# Patient Record
Sex: MALE | Race: WHITE | NOT HISPANIC OR LATINO | Employment: OTHER | URBAN - METROPOLITAN AREA
[De-identification: names, ages, dates, MRNs, and addresses within clinical notes are randomized per-mention and may not be internally consistent; named-entity substitution may affect disease eponyms.]

---

## 2020-03-05 ENCOUNTER — OFFICE VISIT (OUTPATIENT)
Dept: DERMATOLOGY | Facility: CLINIC | Age: 60
End: 2020-03-05
Payer: COMMERCIAL

## 2020-03-05 DIAGNOSIS — R22.9 SUBCUTANEOUS NODULE: ICD-10-CM

## 2020-03-05 DIAGNOSIS — D22.9 MULTIPLE BENIGN NEVI: ICD-10-CM

## 2020-03-05 DIAGNOSIS — L82.1 SK (SEBORRHEIC KERATOSIS): Primary | ICD-10-CM

## 2020-03-05 PROBLEM — M16.12 OSTEOARTHRITIS OF LEFT HIP: Status: ACTIVE | Noted: 2019-10-09

## 2020-03-05 PROCEDURE — 99203 OFFICE O/P NEW LOW 30 MIN: CPT | Performed by: DERMATOLOGY

## 2020-03-05 RX ORDER — VITAMIN B COMPLEX
1 CAPSULE ORAL
COMMUNITY
End: 2020-04-20

## 2020-03-05 ASSESSMENT — PAIN SCALES - GENERAL: PAINLEVEL: NO PAIN (0)

## 2020-03-05 NOTE — NURSING NOTE
Sander Chavira's goals for this visit include:   Chief Complaint   Patient presents with     Skin Check     areas of concern: back, bump inside mouth, buttocks family hx SC        He requests these members of his care team be copied on today's visit information:     PCP: Steve Mays    Referring Provider:  No referring provider defined for this encounter.    There were no vitals taken for this visit.    Do you need any medication refills at today's visit? So Ferris LPN

## 2020-03-05 NOTE — PATIENT INSTRUCTIONS
Cryotherapy    What is it?    Use of a very cold liquid, such as liquid nitrogen, to freeze and destroy abnormal skin cells that need to be removed    What should I expect?    Tenderness and redness    A small blister that might grow and fill with dark purple blood. There may be crusting.    More than one treatment may be needed if the lesions do not go away.    How do I care for the treated area?    Gently wash the area with your hands when bathing.    Use a thin layer of Vaseline to help with healing. You may use a Band-Aid.     The area should heal within 7-10 days and may leave behind a pink or lighter color.     Do not use an antibiotic or Neosporin ointment.     You may take acetaminophen (Tylenol) for pain.     Call your Doctor if you have:    Severe pain    Signs of infection (warmth, redness, cloudy yellow drainage, and or a bad smell)    Questions or concerns    Who should I call with questions?       [facility]: [phone]       [facility]: [phone]       For urgent needs outside of business hours call the [facility] at [phone]        and ask for the dermatology resident on call
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
## 2020-04-20 ENCOUNTER — OFFICE VISIT (OUTPATIENT)
Dept: DERMATOLOGY | Facility: CLINIC | Age: 60
End: 2020-04-20
Payer: COMMERCIAL

## 2020-04-20 DIAGNOSIS — D10.30 FIBROMA OF INTRAORAL REGION: ICD-10-CM

## 2020-04-20 DIAGNOSIS — L82.1 SEBORRHEIC KERATOSIS: ICD-10-CM

## 2020-04-20 DIAGNOSIS — D17.1 LIPOMA OF BUTTOCK: ICD-10-CM

## 2020-04-20 DIAGNOSIS — D18.01 CHERRY ANGIOMA: Primary | ICD-10-CM

## 2020-04-20 DIAGNOSIS — D48.5 NEOPLASM OF UNCERTAIN BEHAVIOR OF SKIN: ICD-10-CM

## 2020-04-20 DIAGNOSIS — L82.0 INFLAMED SEBORRHEIC KERATOSIS: ICD-10-CM

## 2020-04-20 PROCEDURE — 11102 TANGNTL BX SKIN SINGLE LES: CPT | Mod: 59 | Performed by: DERMATOLOGY

## 2020-04-20 PROCEDURE — 11301 SHAVE SKIN LESION 0.6-1.0 CM: CPT | Performed by: DERMATOLOGY

## 2020-04-20 PROCEDURE — 88304 TISSUE EXAM BY PATHOLOGIST: CPT | Mod: TC | Performed by: DERMATOLOGY

## 2020-04-20 PROCEDURE — 88305 TISSUE EXAM BY PATHOLOGIST: CPT | Mod: TC | Performed by: DERMATOLOGY

## 2020-04-20 PROCEDURE — 40808 BIOPSY OF MOUTH LESION: CPT | Performed by: DERMATOLOGY

## 2020-04-20 PROCEDURE — 11401 EXC TR-EXT B9+MARG 0.6-1 CM: CPT | Mod: 59 | Performed by: DERMATOLOGY

## 2020-04-20 PROCEDURE — 17110 DESTRUCTION B9 LES UP TO 14: CPT | Mod: 59 | Performed by: DERMATOLOGY

## 2020-04-20 RX ORDER — MUPIROCIN 20 MG/G
OINTMENT TOPICAL
Qty: 22 G | Refills: 0 | Status: SHIPPED | OUTPATIENT
Start: 2020-04-20

## 2020-04-20 ASSESSMENT — PAIN SCALES - GENERAL: PAINLEVEL: NO PAIN (0)

## 2020-04-20 NOTE — PATIENT INSTRUCTIONS
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
## 2020-04-20 NOTE — PROGRESS NOTES
[facility] Dermatology Note      Dermatology Problem List:  1. 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 Lipoma right medial buttocks, punch excision 4/20/20  3. 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 Inflamed SK left flank, s/p shave removal 4/20/20  5. Inflamed cherry angioma, right chest     CC:   Chief Complaint   Patient presents with     Derm Problem     spot that itches on upper left back, a spot on mid chest, a white bump inner side of right cheek, and then the bump on right cheek - been there for years.          Encounter Date: Apr 20, 2020    History of Present Illness:  Mr. Sander Chavira is a 59 year old male who presents for evaluation of several bothersome papules.     Dr. Puri referred him for treatment of a possible cyst of right medial buttocks. He notes it's been there for years without significant changes, but at times he will sit on the spot and the bump will be painful. It's never expressed anything. It may have started as a pimple or sweat bump.     His primary MD froze a spot on his left mid back. The spot seems to have grown back. It also gets rubbed and itchy intermittently.   He noticed a papule on his midline chest is getting darker. It's not bled.  There is a small bright red bump on the right side of his chest that will get rubbed and irritated.     There is a firm papule on the right buccal mucosa that may have started as a bite injury but he cannot recall. It is bothersome and painful when bitten.        Past Medical History:   Patient Active Problem List   Diagnosis     Osteoarthritis of left hip     Surgical Hx  Vasectomy    Social History:  Patient reports that he has never smoked. He uses smokeless tobacco.      Medications:  Current Outpatient Medications   Medication Sig Dispense Refill     cholecalciferol (D 5000) 125 MCG (5000 UT) CAPS Take 5,000 Units by mouth       vitamin B complex with vitamin C  (STRESS TAB) tablet Take 1 tablet by mouth       LISINOPRIL PO        Allergies   Allergen Reactions     Adhesive Tape Rash     Sulfa Drugs Rash     Hmg-Coa-R Inhibitors      Valtrex [Valacyclovir] Diarrhea     Calamine Rash       Review of Systems:  -Constitutional: Otherwise feeling well today, in usual state of health.  -HEENT: Patient denies nonhealing oral sores.  -Skin: As above in HPI. No additional skin concerns.    Physical exam:  Vitals: There were no vitals taken for this visit.  GEN: This is a well developed, well-nourished male in no acute distress, in a pleasant mood.    SKIN: Focused skin exam excluding the undergarment areas was performed. The exam included the head/face, neck, both arms, chest, back, abdomen, both legs, digits and/or nails.   - Camp skin type: III  - There are dome shaped bright red papules on the trunk neck and extremities.   - There is a tan to brown waxy stuck on papule with surrounding erythema on the left flank.   - There are waxy stuck on tan to brown papules on the trunk and extremities.  - Firm papule, 5mm, on the right buccal mucosa  - right medial buttocks, 1cm localized soft fullness. No buttonhole, no dimple sign, no overlying punctum.   - No other lesions of concern on areas examined.     Impression/Plan:  1. 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     After discussion of benefits and risks including but not limited to bleeding, infection, scar, incomplete removal, recurrence, and non-diagnostic pathology, written consent and photographs were obtained. The area was cleaned with isopropyl alcohol. 1mL of 1% lidocaine with epinephrine was injected to obtain adequate anesthesia of the lesion on the right inner cheek mucosa. A shave excision 0.5cm specimen was performed. Hemostasis was achieved with aluminium chloride. Vaseline and a sterile dressing were applied. The patient tolerated the procedure and  no complications were noted. The patient was provided with verbal and written post care instructions.     2. Lipoma right medial buttocks, punch excision 4/20/20  Bothersome to patient as some seated positions are painful.     After discussion of benefits and risks including but not limited to bleeding, infection, scar, incomplete removal, recurrence, and non-diagnostic pathology, written consent and photographs were obtained. The area was cleaned with isopropyl alcohol. 2mL of 1% lidocaine with epinephrine was injected to obtain adequate anesthesia of the lesion on the right medial buttocks. A 6 mm punch biopsy was performed. The firm adipose nodule was dissected from adjacent tissue with scissors.   4-0 monocryl buried vertical mattress sutures deployed to appose the skin edges. 5-0 Fast Abs Gut sutures were utilized to approximate the epidermal edges.  White petroleum jelly/VaselineTM and a bandage was applied to the wound.  Explicit verbal and written wound care instructions were provided.  The patient left the Dermatology Clinic in good condition.      3. Neoplasm of uncertain behavior, central chest s/p biopsy 4/20/20  After discussion of benefits and risks including but not limited to bleeding, infection, scar, incomplete removal, recurrence, and non-diagnostic biopsy, written consent and photographs were obtained. The area was cleaned with isopropyl alcohol. 1mL of 1% lidocaine with epinephrine was injected to obtain adequate anesthesia of the lesion on the central chest. A shave biopsy was performed. Hemostasis was achieved with aluminium chloride. Vaseline and a sterile dressing were applied. The patient tolerated the procedure and no complications were noted. The patient was provided with verbal and written post care instructions.    ** Update - IDN with ISK collision    4. Inflamed SK left lateral mid back, s/p shave removal 4/20/20  Previously treated with LN2, however it seems to be growing back and  gets irritated by clothing.     After discussion of benefits and risks including but not limited to bleeding, infection, scar, incomplete removal, and non-diagnostic pathology, written consent and photographs were obtained. The area was cleaned with isopropyl alcohol. 1mL of 1% lidocaine was injected to obtain adequate anesthesia of the lesion on the left lateral mid back, measuring 10mm. A shave removal was performed. Hemostasis was achieved with aluminium chloride. Vaseline and a sterile dressing were applied. The patient tolerated the procedure and no complications were noted. The patient was provided with verbal and written post care instructions.     5. Inflamed Cherry Angioma, right lateral chest.   Small papule on right chest will be traumatized intermittently and become painful.     Hyfrecation procedure note: After verbal consent and discussion of risks and benefits including but no limited to dyspigmentation/scar, blister, and pain, anesthesia was achieved with 1% lidocaine with epinephrine. 1 inflamed cherry angioma was treated with a blunt tip hyfrecation probe on setting 6. The bright red color resolved and superficial roxane was removed with gauze.  Post hyfrecation instructions were provided.       Follow-up 1 year with Dr. Puri for skin cancer screening.       Staff Involved:  Staff Only    Provider Disclosure:   The documentation recorded by the scribe accurately reflects the services I personally performed and the decisions made by me.  I personally performed the procedures today.    Kevin Willis DO    Department of Dermatology  [facility]: Phone: [phone], Fax:[phone]  [facility]: Phone: [phone], Fax: [phone]

## 2020-04-20 NOTE — NURSING NOTE
Sander Chavira's goals for this visit include:   Chief Complaint   Patient presents with     Derm Problem     spot that itches on upper left back, a spot on mid chest, a white bump inner side of right cheek, and then the bump on right cheek - been there for years.        He requests these members of his care team be copied on today's visit information: Yes     PCP: Steve Mays    Referring Provider:  No referring provider defined for this encounter.    There were no vitals taken for this visit.     Do you need any medication refills at today's visit? No   LXIONG3, MEDICAL ASSISTANT

## 2020-04-23 ENCOUNTER — TELEPHONE (OUTPATIENT)
Dept: DERMATOLOGY | Facility: CLINIC | Age: 60
End: 2020-04-23

## 2020-04-23 LAB — COPATH REPORT: NORMAL

## 2020-04-23 NOTE — TELEPHONE ENCOUNTER
Notes recorded by Nuria Blankenship RN on 4/23/2020 at 4:10 PM CDT   I spoke with Rodrigo and notified him of the results.  He verbalized understanding and stated his bx sites are healing well.  He declined scheduling his annual follow up and stated he will call back.   Nuria Blankenship RN     ------     Notes recorded by Kevin Granado MD on 4/23/2020 at 3:55 PM CDT   Please call the patient with pathology results.     The biopsies were all benign.     The buttocks was a lipoma.   The back was an SK.   The inner cheek was a bump of scar tissue called a bite fibroma.   The chest had both an SK and a benign mole.     As long as the wounds are healing well, no additional surgery is necessary.   1 year skin cancer screening is appropriate.   Thank you.    Dermatological path order and indications   Order: [id number]   Status:  Final result   Visible to patient:  No (not released) Dx:  Neoplasm of uncertain behavior of ski...   Component  3d ago   Copath Report  Patient Name: RODRIGO MOLINA   MR#: [id number]   Specimen #: [id number]   Collected: 4/20/2020   Received: 4/21/2020   Reported: 4/23/2020 11:47   Ordering Phy(s): KEVIN GRANADO     For improved result formatting, select 'View Enhanced Report Format' under    Linked Documents section.     SPECIMEN(S):   A: Skin, right buttock, punch   B: Skin, left lateral mid back, shave   C: Skin, right inner cheek, shave   D: Skin, central chest, shave     FINAL DIAGNOSIS:   A. Skin, right buttock, punch:   - Lipoma - (see description)     B. Skin, left lateral mid back, shave:   - Seborrheic keratosis - (see description)     C. Skin, right inner cheek, shave:   - Acanthosis and submucosal fibrosis, consistent with bite fibroma - (see   description)      D. Skin, central chest, shave:   - Intradermal melanocytic nevus adjacent to inflamed seborrheic keratosis           Nuria Blankenship RN

## 2020-10-12 ENCOUNTER — TELEPHONE (OUTPATIENT)
Dept: DERMATOLOGY | Facility: CLINIC | Age: 60
End: 2020-10-12

## 2020-10-12 NOTE — TELEPHONE ENCOUNTER
[facility] Call Center    Phone Message    May a detailed message be left on voicemail: yes     Reason for Call: Patient called wanting to reschedule his follow up with Dr. Willis. Please advise. Thank you.    Action Taken: Message routed to:  Adult Clinics: Dermatology p [id number]    Travel Screening: Not Applicable

## 2022-11-17 ENCOUNTER — TRANSCRIBE ORDERS (OUTPATIENT)
Dept: OTHER | Age: 62
End: 2022-11-17

## 2022-11-17 DIAGNOSIS — R53.1 WEAKNESS: Primary | ICD-10-CM

## 2023-03-09 NOTE — TELEPHONE ENCOUNTER
Records Requested     March 9, 2023 10:30 AM   [id number]   Facility   [facility] ([facility])   Outcome 10:30am Sent request for MRI Imaging to be pushed to PACs. -JA     Action 3/31/23 MV 9.20am   Action Taken 1) 2nd imaging request faxed to [facility]  2) Request faxed to [facility] for additional records and imaging         RECORDS RECEIVED FROM: External   REASON FOR VISIT: Myopathy   Date of Appt: 5/18/23   NOTES (FOR ALL VISITS) STATUS DETAILS   OFFICE NOTE from referring provider In process  11/17/22 Srikanth Brown MD -  [facility]   OFFICE NOTE from other specialist Care Everywhere  11/13/22  [facility]   MEDICATION LIST Care Everywhere    IMAGING  (FOR ALL VISITS)     MRI (HEAD, NECK, SPINE) In process [facility]:  12/13/17 MRI Lumbar Spine

## 2023-04-14 ENCOUNTER — LAB (OUTPATIENT)
Dept: LAB | Facility: HOSPITAL | Age: 63
End: 2023-04-14
Payer: COMMERCIAL

## 2023-04-14 ENCOUNTER — OFFICE VISIT (OUTPATIENT)
Dept: NEUROLOGY | Facility: CLINIC | Age: 63
End: 2023-04-14
Payer: COMMERCIAL

## 2023-04-14 ENCOUNTER — OFFICE VISIT (OUTPATIENT)
Dept: NEUROLOGY | Facility: CLINIC | Age: 63
End: 2023-04-14
Attending: PSYCHIATRY & NEUROLOGY
Payer: COMMERCIAL

## 2023-04-14 VITALS — DIASTOLIC BLOOD PRESSURE: 95 MMHG | HEART RATE: 66 BPM | WEIGHT: 182.4 LBS | SYSTOLIC BLOOD PRESSURE: 135 MMHG

## 2023-04-14 DIAGNOSIS — G62.9 NEUROPATHY: Primary | ICD-10-CM

## 2023-04-14 DIAGNOSIS — M79.2 NEUROPATHIC PAIN: ICD-10-CM

## 2023-04-14 DIAGNOSIS — G62.9 NEUROPATHY: ICD-10-CM

## 2023-04-14 DIAGNOSIS — R29.898 WEAKNESS OF BOTH LOWER EXTREMITIES: ICD-10-CM

## 2023-04-14 LAB
ALBUMIN SERPL BCG-MCNC: 4.2 G/DL (ref 3.5–5.2)
ALP SERPL-CCNC: 58 U/L (ref 40–129)
ALT SERPL W P-5'-P-CCNC: 41 U/L (ref 10–50)
AMMONIA PLAS-SCNC: 16 UMOL/L (ref 16–60)
ANION GAP SERPL CALCULATED.3IONS-SCNC: 9 MMOL/L (ref 7–15)
AST SERPL W P-5'-P-CCNC: 27 U/L (ref 10–50)
BASOPHILS # BLD AUTO: 0 10E3/UL (ref 0–0.2)
BASOPHILS NFR BLD AUTO: 1 %
BILIRUB SERPL-MCNC: 0.5 MG/DL
BUN SERPL-MCNC: 16.1 MG/DL (ref 8–23)
CALCIUM SERPL-MCNC: 8.6 MG/DL (ref 8.8–10.2)
CHLORIDE SERPL-SCNC: 105 MMOL/L (ref 98–107)
CK SERPL-CCNC: 80 U/L (ref 39–308)
CREAT SERPL-MCNC: 1.01 MG/DL (ref 0.67–1.17)
DEPRECATED HCO3 PLAS-SCNC: 25 MMOL/L (ref 22–29)
EOSINOPHIL # BLD AUTO: 0.2 10E3/UL (ref 0–0.7)
EOSINOPHIL NFR BLD AUTO: 4 %
ERYTHROCYTE [DISTWIDTH] IN BLOOD BY AUTOMATED COUNT: 13.1 % (ref 10–15)
GFR SERPL CREATININE-BSD FRML MDRD: 84 ML/MIN/1.73M2
GLUCOSE SERPL-MCNC: 104 MG/DL (ref 70–99)
HBA1C MFR BLD: 5.7 %
HCT VFR BLD AUTO: 41.9 % (ref 40–53)
HGB BLD-MCNC: 14.1 G/DL (ref 13.3–17.7)
IMM GRANULOCYTES # BLD: 0 10E3/UL
IMM GRANULOCYTES NFR BLD: 0 %
LYMPHOCYTES # BLD AUTO: 1.3 10E3/UL (ref 0.8–5.3)
LYMPHOCYTES NFR BLD AUTO: 32 %
Lab: NORMAL
MAGNESIUM SERPL-MCNC: 2 MG/DL (ref 1.7–2.3)
MCH RBC QN AUTO: 31.4 PG (ref 26.5–33)
MCHC RBC AUTO-ENTMCNC: 33.7 G/DL (ref 31.5–36.5)
MCV RBC AUTO: 93 FL (ref 78–100)
MONOCYTES # BLD AUTO: 0.4 10E3/UL (ref 0–1.3)
MONOCYTES NFR BLD AUTO: 10 %
NEUTROPHILS # BLD AUTO: 2.2 10E3/UL (ref 1.6–8.3)
NEUTROPHILS NFR BLD AUTO: 53 %
NRBC # BLD AUTO: 0 10E3/UL
NRBC BLD AUTO-RTO: 0 /100
PERFORMING LABORATORY: NORMAL
PLATELET # BLD AUTO: 247 10E3/UL (ref 150–450)
POTASSIUM SERPL-SCNC: 4.5 MMOL/L (ref 3.4–5.3)
PROT SERPL-MCNC: 6.7 G/DL (ref 6.4–8.3)
RBC # BLD AUTO: 4.49 10E6/UL (ref 4.4–5.9)
SODIUM SERPL-SCNC: 139 MMOL/L (ref 136–145)
TEST NAME: NORMAL
TOTAL PROTEIN SERUM FOR ELP: 6.6 G/DL (ref 6.4–8.3)
TSH SERPL DL<=0.005 MIU/L-ACNC: 1.33 UIU/ML (ref 0.3–4.2)
VIT B12 SERPL-MCNC: 407 PG/ML (ref 232–1245)
WBC # BLD AUTO: 4.1 10E3/UL (ref 4–11)

## 2023-04-14 PROCEDURE — 82607 VITAMIN B-12: CPT

## 2023-04-14 PROCEDURE — 85025 COMPLETE CBC W/AUTO DIFF WBC: CPT

## 2023-04-14 PROCEDURE — 86334 IMMUNOFIX E-PHORESIS SERUM: CPT | Mod: 26

## 2023-04-14 PROCEDURE — 80053 COMPREHEN METABOLIC PANEL: CPT

## 2023-04-14 PROCEDURE — 86036 ANCA SCREEN EACH ANTIBODY: CPT

## 2023-04-14 PROCEDURE — 36415 COLL VENOUS BLD VENIPUNCTURE: CPT

## 2023-04-14 PROCEDURE — 84165 PROTEIN E-PHORESIS SERUM: CPT | Mod: 26

## 2023-04-14 PROCEDURE — 83036 HEMOGLOBIN GLYCOSYLATED A1C: CPT

## 2023-04-14 PROCEDURE — 83519 RIA NONANTIBODY: CPT

## 2023-04-14 PROCEDURE — 95886 MUSC TEST DONE W/N TEST COMP: CPT | Mod: LT | Performed by: PSYCHIATRY & NEUROLOGY

## 2023-04-14 PROCEDURE — 99205 OFFICE O/P NEW HI 60 MIN: CPT | Mod: 25 | Performed by: PSYCHIATRY & NEUROLOGY

## 2023-04-14 PROCEDURE — 82140 ASSAY OF AMMONIA: CPT

## 2023-04-14 PROCEDURE — 83516 IMMUNOASSAY NONANTIBODY: CPT

## 2023-04-14 PROCEDURE — 82390 ASSAY OF CERULOPLASMIN: CPT

## 2023-04-14 PROCEDURE — 86618 LYME DISEASE ANTIBODY: CPT

## 2023-04-14 PROCEDURE — 83735 ASSAY OF MAGNESIUM: CPT

## 2023-04-14 PROCEDURE — 82525 ASSAY OF COPPER: CPT

## 2023-04-14 PROCEDURE — 84155 ASSAY OF PROTEIN SERUM: CPT | Mod: 91

## 2023-04-14 PROCEDURE — 82397 CHEMILUMINESCENT ASSAY: CPT

## 2023-04-14 PROCEDURE — 82550 ASSAY OF CK (CPK): CPT

## 2023-04-14 PROCEDURE — 95910 NRV CNDJ TEST 7-8 STUDIES: CPT | Performed by: PSYCHIATRY & NEUROLOGY

## 2023-04-14 PROCEDURE — 82164 ANGIOTENSIN I ENZYME TEST: CPT

## 2023-04-14 PROCEDURE — 86038 ANTINUCLEAR ANTIBODIES: CPT

## 2023-04-14 PROCEDURE — 86255 FLUORESCENT ANTIBODY SCREEN: CPT

## 2023-04-14 PROCEDURE — 84443 ASSAY THYROID STIM HORMONE: CPT

## 2023-04-14 PROCEDURE — 84165 PROTEIN E-PHORESIS SERUM: CPT | Mod: TC | Performed by: PATHOLOGY

## 2023-04-14 PROCEDURE — 84425 ASSAY OF VITAMIN B-1: CPT

## 2023-04-14 PROCEDURE — 84999 UNLISTED CHEMISTRY PROCEDURE: CPT

## 2023-04-14 PROCEDURE — 86334 IMMUNOFIX E-PHORESIS SERUM: CPT | Performed by: PATHOLOGY

## 2023-04-14 NOTE — LETTER
4/14/2023         RE: Sander Chavira  [address]  [place] [address]        Dear Colleague,    Thank you for referring your patient, Sander Chavira, to the [facility] [place]. Please see a copy of my visit note below.    NEUROLOGY OUTPATIENT CONSULT NOTE   Apr 14, 2023     CHIEF COMPLAINT/REASON FOR VISIT/REASON FOR CONSULT  Patient presents with:  weakness: New patient    REASON FOR CONSULTATION- Leg weakness/pain    REFERRAL SOURCE  Dr. Srikanth Brown  CC Dr. Srikanth Brown    HISTORY OF PRESENT ILLNESS  Sander Chavira is a 62 year old male seen today for evaluation of leg weakness/pain.  In 2015 he was put on simvastatin for hyperlipidemia.  He is not sure what dose he was put on.  He slowly started having more and more symptoms.  His symptoms consist of generalized weakness and generalized pain.  He realized that this was secondary to the medication and then stopped the medication.  Symptoms did slowly improve but did not completely resolve.  Over the last few years he continues to have weakness in both legs along with a lot of pain.  Does have some numbness in his feet.  He is unable to do a lot of exercises as that makes the pain worse.  He is not currently employed.  When he tries to walk he feels like he is walking on hot coal.  Does report some minor balance issues.  Feels like he is tripping on rugs/carpets.  No major neck pain or back pain.    [facility]  Extensive test including MRI of the brain/cervical spine/T-spine/L-spine were done and these were noncontributory per the patient.  He also had a EMG which was negative.  Did have extensive blood testing that was negative as well.  Has not noticed any new symptoms in the last year though there has been progression of his symptoms.  No family history of neurological problems.    Previous history is reviewed and this is unchanged.    PAST MEDICAL/SURGICAL HISTORY  No past medical history  on file.  Patient Active Problem List   Diagnosis     Osteoarthritis of left hip   Significant for high cholesterol, high blood pressure    FAMILY HISTORY  No family history on file.   High cholesterol high blood pressure migraine headaches arthritis cancer/leukemia.  Skin cancer.    SOCIAL HISTORY  Social History     Tobacco Use     Smoking status: Never     Smokeless tobacco: Current       SYSTEMS REVIEW  Twelve-system ROS was done and other than the HPI this was negative except for arm and leg pain, numbness/tingling, weakness (, difficulty walking, ringing in the ears.    MEDICATIONS  cholecalciferol (D 5000) 125 MCG (5000 UT) CAPS, Take 5,000 Units by mouth  vitamin B complex with vitamin C (STRESS TAB) tablet, Take 1 tablet by mouth  LISINOPRIL PO,   mupirocin (BACTROBAN) 2 % external ointment, Use 2 times a day to affected area. (Patient not taking: Reported on 4/14/2023)    No current facility-administered medications on file prior to visit.       PHYSICAL EXAMINATION  VITALS: BP (!) 135/95   Pulse 66   Wt 82.7 kg (182 lb 6.4 oz)   GENERAL: Healthy appearing, alert, no acute distress, normal habitus.  CARDIOVASCULAR: Extremities warm and well perfused. Pulses present.   NEUROLOGICAL:  Patient is awake and oriented to self, place and time.  Attention span is normal.  Memory is grossly intact.  Language is fluent and follows commands appropriately.  Appropriate fund of knowledge. Cranial nerves 2-12 are intact. There is no pronator drift.  Motor exam shows 5/5 strength in upper extremities.  Has 3+/5 strength in bilateral hip flexion knee flexion and extension.  Dorsiflexion plantarflexion are strong.  Tone is symmetric bilaterally in upper and lower extremities.  Reflexes are symmetric and absent in upper extremities and lower extremities.  Ankle jerks are absent.  Sensory exam is grossly intact to light touch, pin prick and vibration with slightly decreased pinprick and vibration in the feet.  Finger to  nose and heel to shin is without dysmetria.  Romberg is negative.  Gait is slightly wide-based and the patient is able to do tandem walk and walk on toes and heels with some difficulty.    DIAGNOSTICS  MRI L spine 2017  IMPRESSION:   Mild degenerative changes.  Negative for significant disc protrusion spinal   canal or neural foraminal compromise.    XR pelvis -2020  IMPRESSION:   Expected immediate postoperative appearance of right total hip arthroplasty.    RELEVANT LABS  CBC and CMP in 2020 were noncontributory.    OUTSIDE RECORDS  Outside referral notes and chart notes were reviewed and pertinent information has been summarized (in addition to the HPI):-      IMPRESSION/REPORT/PLAN  History of statin use  Rule out myopathy  Neuropathy  Neuropathic pain  Weakness of both lower extremities    This is a 62 year old male with bilateral leg weakness and generalized pain after use of statins.  Exam today shows absent generalized reflexes with bilateral leg weakness and decreased pinprick and vibratory sense in the feet.  Overall exam is suggestive of neuropathy though there could be superimposed myopathy as well.  Previous testing per patient report has been negative though I do not have a copy of the results.    We will start by repeating the EMG to see if it can confirm the diagnosis of neuropathy/myopathy.  We will check blood work to look for causes of neuropathy/myopathy.  Possibly given the statin use right before the symptoms came on this could be anti-[employer] a reductase inhibitor antibody syndrome.  Could consider lumbar puncture for possible CIDP also if blood work has been negative.  We will also check a myasthenia gravis panel since his symptoms are worse with exercise.  Discussed prognosis/treatment options with the patient.    I can see him back in 1 month.    -     Vitamin B12; Future  -     Vitamin B1 whole blood; Future  -     TSH with free T4 reflex; Future  -     Protein Immunofixation Serum;  Future  -     Protein electrophoresis; Future  -     Ammonia; Future  -     ANCA IgG by IFA with Reflex to Titer; Future  -     Angiotensin converting enzyme; Future  -     Anti Nuclear Americo IgG by IFA with Reflex; Future  -     CK total; Future  -     Ceruloplasmin; Future  -     Copper level; Future  -     Comprehensive metabolic panel; Future  -     CBC with Platelets & Differential; Future  -     Lyme Disease Total Abs Bld with Reflex to Confirm CLIA; Future  -     Magnesium; Future  -     ACETYLCHOLINE RECEPTOR BINDING; Future  -     STRIATED MUSCLE ANTIBODY IGG; Future  -     ACETYLCHOLINE MODULATING ANTIBODY; Future  -     ACETYLCHOLINE RECEPTOR BLOCKING AMERICO; Future  -     Hemoglobin A1c; Future  -     GM1 antibody panel; Future  -     [facility]; HmGcoA antibody test; HmGcoA antibody test (Laboratory Miscellaneous Order); Future  -     EMG; Future  - [facility].    Return in about 1 month (around 5/14/2023) for In-Clinic Visit (must), After testing.    Over 75 minutes were spent coordinating the care for the patient on the day of the encounter.  This includes previsit, during visit and post visit activities as documented above.  Counseling patient.  Multiple test ordered.  Chart reviewed the pertinent records not available.  Requesting outside records.  (Activities include but not inclusive of reviewing chart, reviewing outside records, reviewing labs and imaging study results as well as the images, patient visit time including getting history and exam,  use if applicable, review of test results with the patient and coming up with a plan in a shared model, counseling patient and family, education and answering patient questions, EMR , EMR diagnosis entry and problem list management, medication reconciliation and prescription management if applicable, paperwork if applicable, printing documents and documentation of the visit activities.)      Harsh  MD Jayne  Neurologist  [facility]  Tel:- [phone]    This note was dictated using voice recognition software.  Any grammatical or context distortions are unintentional and inherent to the software.        Again, thank you for allowing me to participate in the care of your patient.        Sincerely,        Сергей Godoy MD

## 2023-04-14 NOTE — LETTER
4/14/2023         RE: Sander Chavira  [address]  [facility] [address]        Dear Colleague,    Thank you for referring your patient, Sander Chavira, to the [facility]UROLOG[facility] [place]. Please see a copy of my visit note below.    See procedure note.      Again, thank you for allowing me to participate in the care of your patient.        Sincerely,        Сергей Godoy MD

## 2023-04-14 NOTE — PROCEDURES
[facility]     [facility]ly [facility]  [address], [address]  [place] [address]  Tel: [phone]  Fax: [phone]          Full Name: Sander Chavira Gender: Male  Patient ID: [id number] YOB: 1960      Visit Date: 4/14/2023 08:58  Age: 62 Years 6 Months Old  Interpreted By: Сергей Godoy MD   Ref Dr.: Steve Mays MD  Tech: ST   Height: 5 feet 6 inch  Reason for referral: Evaluate bilateral lowers. c/o pain, burning in both legs/feet > 5 years. Right = Left. h/o both hips replaced.      Motor NCS      Nerve / Sites Lat Amp Dist Jaswant    ms mV cm m/s   R Peroneal - EDB      Ankle 4.58 5.3 8       Fib head 11.61 4.5 26.5 38      Pop fossa 14.48 4.6 11 38   L Peroneal - EDB      Ankle 5.47 5.1 8       Fib head 12.34 4.7 27 39      Pop fossa 15.31 4.7 11 37   R Tibial - AH      Ankle 4.38 3.8 8       Pop fossa 14.06 3.6 38 39   L Tibial - AH      Ankle 4.32 5.0 8       Pop fossa 13.85 4.6 35 37       F  Wave      Nerve Fmin    ms   R Tibial - AH 51.61   L Tibial - AH 51.41       Sensory NCS      Nerve / Sites Onset Lat Pk Lat Amp.2-3 Dist Jaswant    ms ms  V cm m/s   R Sural - Ankle (Calf)      Calf 3.80 4.90 11.0 14 37   L Sural - Ankle (Calf)      Calf 3.59 4.32 5.6 14 39   R Superficial peroneal - Ankle      Lat leg 3.80 4.53 3.8 12 32   L Superficial peroneal - Ankle      Lat leg 3.33 5.21 6.4 12 36       EMG Summary Table     Spontaneous MUAP Rcmt Note   Muscle Fib PSW Fasc IA # Amp Dur PPP Rate Type   R. Gluteus medius None None None N N N N N N N   R. Gluteus riaz None None None N N N N N N N   R. L3 paraspinal None None None N N N N N N N   R. L4 paraspinal None None None N N N N N N N   R. L5 paraspinal None None None N N N N N N N   R. S1 paraspinal None None None N N N N N N N   R. Iliopsoas None None None N N N N N N N   R. Adductor pennie None None None N N N N N N N   R. Quadriceps None None None N N N N N N N   R.  Tibialis anterior None None None N N N N N N N   R. Gastrocnemius (Medial head) None None None N N N N N N N   R. Tibialis posterior None None None N N N N N N N   L. Adductor pennie None None None N N N N N N N   L. Quadriceps None None None N N N N N N N   L. Tibialis anterior None None None N N N N N N N   L. Gastrocnemius (Medial head) None None None N N N N N N N   L. Tibialis posterior None None None N N N N N N N     SUMMARY  Nerve conduction and EMG study of bilateral lower extremities shows:    Normal right peroneal distal motor latency, amplitude and with low conduction velocity.  Normal left peroneal distal motor latency, amplitude and with low conduction velocity.  Normal right tibial distal motor latency with decreased amplitude and decreased conduction velocity.  Normal left tibial distal motor latency with low normal amplitude and decreased conduction velocity.  Abnormal bilateral sural and superficial peroneal sensory SNAP.  Monopolar needle exam is normal.    CLINICAL INTERPRETATION:  This is an abnormal nerve conduction and EMG study.  The study is suggestive of a sensorimotor polyneuropathy in both legs.  Further clinical correlation is needed.     Сергей Godoy MD  Neurologist  [facility]  Tel:- [phone]

## 2023-04-14 NOTE — NURSING NOTE
Chief Complaint   Patient presents with     weakness     New patient     Avril Steele on 4/14/2023 at 7:38 AM

## 2023-04-14 NOTE — PROGRESS NOTES
NEUROLOGY OUTPATIENT CONSULT NOTE   Apr 14, 2023     CHIEF COMPLAINT/REASON FOR VISIT/REASON FOR CONSULT  Patient presents with:  weakness: New patient    REASON FOR CONSULTATION- Leg weakness/pain    REFERRAL SOURCE  Dr. Srikanth Brown  CC Dr. Srikanth Brown    HISTORY OF PRESENT ILLNESS  Sander Chavira is a 62 year old male seen today for evaluation of leg weakness/pain.  In 2015 he was put on simvastatin for hyperlipidemia.  He is not sure what dose he was put on.  He slowly started having more and more symptoms.  His symptoms consist of generalized weakness and generalized pain.  He realized that this was secondary to the medication and then stopped the medication.  Symptoms did slowly improve but did not completely resolve.  Over the last few years he continues to have weakness in both legs along with a lot of pain.  Does have some numbness in his feet.  He is unable to do a lot of exercises as that makes the pain worse.  He is not currently employed.  When he tries to walk he feels like he is walking on hot coal.  Does report some minor balance issues.  Feels like he is tripping on rugs/carpets.  No major neck pain or back pain.    [facility]  Extensive test including MRI of the brain/cervical spine/T-spine/L-spine were done and these were noncontributory per the patient.  He also had a EMG which was negative.  Did have extensive blood testing that was negative as well.  Has not noticed any new symptoms in the last year though there has been progression of his symptoms.  No family history of neurological problems.    Previous history is reviewed and this is unchanged.    PAST MEDICAL/SURGICAL HISTORY  No past medical history on file.  Patient Active Problem List   Diagnosis     Osteoarthritis of left hip   Significant for high cholesterol, high blood pressure    FAMILY HISTORY  No family history on file.   High cholesterol high blood pressure migraine headaches arthritis  cancer/leukemia.  Skin cancer.    SOCIAL HISTORY  Social History     Tobacco Use     Smoking status: Never     Smokeless tobacco: Current       SYSTEMS REVIEW  Twelve-system ROS was done and other than the HPI this was negative except for arm and leg pain, numbness/tingling, weakness (, difficulty walking, ringing in the ears.    MEDICATIONS  cholecalciferol (D 5000) 125 MCG (5000 UT) CAPS, Take 5,000 Units by mouth  vitamin B complex with vitamin C (STRESS TAB) tablet, Take 1 tablet by mouth  LISINOPRIL PO,   mupirocin (BACTROBAN) 2 % external ointment, Use 2 times a day to affected area. (Patient not taking: Reported on 4/14/2023)    No current facility-administered medications on file prior to visit.       PHYSICAL EXAMINATION  VITALS: BP (!) 135/95   Pulse 66   Wt 82.7 kg (182 lb 6.4 oz)   GENERAL: Healthy appearing, alert, no acute distress, normal habitus.  CARDIOVASCULAR: Extremities warm and well perfused. Pulses present.   NEUROLOGICAL:  Patient is awake and oriented to self, place and time.  Attention span is normal.  Memory is grossly intact.  Language is fluent and follows commands appropriately.  Appropriate fund of knowledge. Cranial nerves 2-12 are intact. There is no pronator drift.  Motor exam shows 5/5 strength in upper extremities.  Has 3+/5 strength in bilateral hip flexion knee flexion and extension.  Dorsiflexion plantarflexion are strong.  Tone is symmetric bilaterally in upper and lower extremities.  Reflexes are symmetric and absent in upper extremities and lower extremities.  Ankle jerks are absent.  Sensory exam is grossly intact to light touch, pin prick and vibration with slightly decreased pinprick and vibration in the feet.  Finger to nose and heel to shin is without dysmetria.  Romberg is negative.  Gait is slightly wide-based and the patient is able to do tandem walk and walk on toes and heels with some difficulty.    DIAGNOSTICS  MRI L spine 2017  IMPRESSION:   Mild degenerative  changes.  Negative for significant disc protrusion spinal   canal or neural foraminal compromise.    XR pelvis -2020  IMPRESSION:   Expected immediate postoperative appearance of right total hip arthroplasty.    RELEVANT LABS  CBC and CMP in 2020 were noncontributory.    OUTSIDE RECORDS  Outside referral notes and chart notes were reviewed and pertinent information has been summarized (in addition to the HPI):-      IMPRESSION/REPORT/PLAN  History of statin use  Rule out myopathy  Neuropathy  Neuropathic pain  Weakness of both lower extremities    This is a 62 year old male with bilateral leg weakness and generalized pain after use of statins.  Exam today shows absent generalized reflexes with bilateral leg weakness and decreased pinprick and vibratory sense in the feet.  Overall exam is suggestive of neuropathy though there could be superimposed myopathy as well.  Previous testing per patient report has been negative though I do not have a copy of the results.    We will start by repeating the EMG to see if it can confirm the diagnosis of neuropathy/myopathy.  We will check blood work to look for causes of neuropathy/myopathy.  Possibly given the statin use right before the symptoms came on this could be anti-HMG [employer] a reductase inhibitor antibody syndrome.  Could consider lumbar puncture for possible CIDP also if blood work has been negative.  We will also check a myasthenia gravis panel since his symptoms are worse with exercise.  Discussed prognosis/treatment options with the patient.    I can see him back in 1 month.    -     Vitamin B12; Future  -     Vitamin B1 whole blood; Future  -     TSH with free T4 reflex; Future  -     Protein Immunofixation Serum; Future  -     Protein electrophoresis; Future  -     Ammonia; Future  -     ANCA IgG by IFA with Reflex to Titer; Future  -     Angiotensin converting enzyme; Future  -     Anti Nuclear Paradise IgG by IFA with Reflex; Future  -     CK total; Future  -      Ceruloplasmin; Future  -     Copper level; Future  -     Comprehensive metabolic panel; Future  -     CBC with Platelets & Differential; Future  -     Lyme Disease Total Abs Bld with Reflex to Confirm CLIA; Future  -     Magnesium; Future  -     ACETYLCHOLINE RECEPTOR BINDING; Future  -     STRIATED MUSCLE ANTIBODY IGG; Future  -     ACETYLCHOLINE MODULATING ANTIBODY; Future  -     ACETYLCHOLINE RECEPTOR BLOCKING AMERICO; Future  -     Hemoglobin A1c; Future  -     GM1 antibody panel; Future  -     [facility]; HmGcoA antibody test; HmGcoA antibody test (Laboratory Miscellaneous Order); Future  -     EMG; Future  - [facility].    Return in about 1 month (around 5/14/2023) for In-Clinic Visit (must), After testing.    Over 75 minutes were spent coordinating the care for the patient on the day of the encounter.  This includes previsit, during visit and post visit activities as documented above.  Counseling patient.  Multiple test ordered.  Chart reviewed the pertinent records not available.  Requesting outside records.  (Activities include but not inclusive of reviewing chart, reviewing outside records, reviewing labs and imaging study results as well as the images, patient visit time including getting history and exam,  use if applicable, review of test results with the patient and coming up with a plan in a shared model, counseling patient and family, education and answering patient questions, EMR , EMR diagnosis entry and problem list management, medication reconciliation and prescription management if applicable, paperwork if applicable, printing documents and documentation of the visit activities.)      Сергей Godoy MD  Neurologist  [facility] Neurology [facility]  Tel:- [phone]    This note was dictated using voice recognition software.  Any grammatical or context distortions are unintentional and inherent to the software.

## 2023-04-15 LAB
ACE SERPL-CCNC: 41 U/L
GM1 GANGL IGG SER IA-ACNC: 8 IV
GM1 GANGL IGM SER IA-ACNC: 6 IV

## 2023-04-16 LAB
ACHR BIND AB SER-SCNC: 0 NMOL/L
ACHR BLOCK AB/ACHR TOTAL SFR SER: 16 %
ACHR MOD AB/ACHR TOTAL SFR SER: 6 %
COPPER SERPL-MCNC: 99 UG/DL
STRIA MUS IGG SER QL IF: NORMAL

## 2023-04-17 LAB
ALBUMIN SERPL ELPH-MCNC: 4.3 G/DL (ref 3.7–5.1)
ALPHA1 GLOB SERPL ELPH-MCNC: 0.3 G/DL (ref 0.2–0.4)
ALPHA2 GLOB SERPL ELPH-MCNC: 0.6 G/DL (ref 0.5–0.9)
ANA PAT SER IF-IMP: ABNORMAL
ANA SER QL IF: ABNORMAL
ANA TITR SER IF: ABNORMAL {TITER}
ANCA AB PATTERN SER IF-IMP: NORMAL
B BURGDOR IGG+IGM SER QL: 0.04
B-GLOBULIN SERPL ELPH-MCNC: 0.7 G/DL (ref 0.6–1)
C-ANCA TITR SER IF: NORMAL {TITER}
CERULOPLASMIN SERPL-MCNC: 21 MG/DL (ref 20–60)
GAMMA GLOB SERPL ELPH-MCNC: 0.8 G/DL (ref 0.7–1.6)
M PROTEIN SERPL ELPH-MCNC: 0 G/DL
MAYO MISC RESULT: NORMAL
PROT PATTERN SERPL ELPH-IMP: NORMAL
PROT PATTERN SERPL IFE-IMP: NORMAL

## 2023-04-18 ENCOUNTER — TELEPHONE (OUTPATIENT)
Dept: NEUROLOGY | Facility: CLINIC | Age: 63
End: 2023-04-18

## 2023-04-18 DIAGNOSIS — G62.9 NEUROPATHY: Primary | ICD-10-CM

## 2023-04-18 NOTE — TELEPHONE ENCOUNTER
[facility]    Phone Message    May a detailed message be left on voicemail: no     Reason for Call: Requesting Results   Name/type of test: Lab results  Date of test: 4/14/23  Was test done at a location other than [facility] (Please fill in the location if not [facility])?: No    Please call pt back to review lab results at # [phone]      Action Taken: Message routed to:  Other: [facility]     Travel Screening: Not Applicable

## 2023-04-19 LAB — VIT B1 PYROPHOSHATE BLD-SCNC: 125 NMOL/L

## 2023-04-19 NOTE — TELEPHONE ENCOUNTER
Testing overall is noncontributory.  Blood test shows borderline elevated ALVARADO/prediabetes.  Possibly these are false positive.  Lumbar puncture ordered.

## 2023-04-21 NOTE — TELEPHONE ENCOUNTER
[facility]    Phone Message    May a detailed message be left on voicemail: yes     Reason for Call: patient called to speak to care team regarding his results, please call pt back at [phone]. Patient can be reached anytime        Action Taken: Other: [facility]    Travel Screening: Not Applicable

## 2023-04-25 ENCOUNTER — HOSPITAL ENCOUNTER (OUTPATIENT)
Facility: CLINIC | Age: 63
End: 2023-04-25
Payer: COMMERCIAL

## 2023-05-05 ENCOUNTER — HOSPITAL ENCOUNTER (OUTPATIENT)
Facility: CLINIC | Age: 63
End: 2023-05-05

## 2023-05-18 ENCOUNTER — PRE VISIT (OUTPATIENT)
Dept: NEUROLOGY | Facility: CLINIC | Age: 63
End: 2023-05-18

## 2023-05-30 ENCOUNTER — OFFICE VISIT (OUTPATIENT)
Dept: NEUROLOGY | Facility: CLINIC | Age: 63
End: 2023-05-30
Payer: COMMERCIAL

## 2023-05-30 VITALS
WEIGHT: 175 LBS | DIASTOLIC BLOOD PRESSURE: 92 MMHG | SYSTOLIC BLOOD PRESSURE: 141 MMHG | RESPIRATION RATE: 18 BRPM | HEART RATE: 66 BPM

## 2023-05-30 DIAGNOSIS — R29.898 WEAKNESS OF BOTH LOWER EXTREMITIES: ICD-10-CM

## 2023-05-30 DIAGNOSIS — G62.9 NEUROPATHY: Primary | ICD-10-CM

## 2023-05-30 DIAGNOSIS — M79.2 NEUROPATHIC PAIN: ICD-10-CM

## 2023-05-30 DIAGNOSIS — R73.03 PRE-DIABETES: ICD-10-CM

## 2023-05-30 DIAGNOSIS — R76.8 POSITIVE ANA (ANTINUCLEAR ANTIBODY): ICD-10-CM

## 2023-05-30 PROCEDURE — 99215 OFFICE O/P EST HI 40 MIN: CPT | Performed by: PSYCHIATRY & NEUROLOGY

## 2023-05-30 NOTE — PROGRESS NOTES
NEUROLOGY OUTPATIENT PROGRESS NOTE   May 30, 2023     CHIEF COMPLAINT/REASON FOR VISIT/REASON FOR CONSULT  Patient presents with:  Follow Up: Wants more information on Spinal tap prior to completing     REASON FOR CONSULTATION- Leg weakness/pain    REFERRAL SOURCE  Dr. Srikanth Brown   Dr. Srikanth Brown    HISTORY OF PRESENT ILLNESS  Sander Chavira is a 62 year old male seen today for evaluation of leg weakness/pain.  In 2015 he was put on simvastatin for hyperlipidemia.  He is not sure what dose he was put on.  He slowly started having more and more symptoms.  His symptoms consist of generalized weakness and generalized pain.  He realized that this was secondary to the medication and then stopped the medication.  Symptoms did slowly improve but did not completely resolve.  Over the last few years he continues to have weakness in both legs along with a lot of pain.  Does have some numbness in his feet.  He is unable to do a lot of exercises as that makes the pain worse.  He is not currently employed.  When he tries to walk he feels like he is walking on hot coal.  Does report some minor balance issues.  Feels like he is tripping on rugs/carpets.  No major neck pain or back pain.    [facility]  Extensive test including MRI of the brain/cervical spine/T-spine/L-spine were done and these were noncontributory per the patient.  He also had a EMG which was negative.  Did have extensive blood testing that was negative as well.  Has not noticed any new symptoms in the last year though there has been progression of his symptoms.  No family history of neurological problems.    5/30/23  Patient returns today.  His symptoms are about the same.  Lumbar puncture was ordered which she has not been able to complete.  Reviewed his blood work and he tested positive for prediabetes.  Feels that it is always been positive long-term.  His ALVARADO was also positive the denies any lupus symptoms.  He  thinks that his symptoms might be more related to use of statin even though testing did come back negative.    Previous history is reviewed and this is unchanged.    PAST MEDICAL/SURGICAL HISTORY  History reviewed. No pertinent past medical history.  Patient Active Problem List   Diagnosis     Osteoarthritis of left hip   Significant for high cholesterol, high blood pressure    FAMILY HISTORY  History reviewed. No pertinent family history.   High cholesterol high blood pressure migraine headaches arthritis cancer/leukemia.  Skin cancer.    SOCIAL HISTORY  Social History     Tobacco Use     Smoking status: Never     Smokeless tobacco: Current       SYSTEMS REVIEW  Twelve-system ROS was done and other than the HPI this was negative except for arm and leg pain, numbness/tingling, weakness (, difficulty walking, ringing in the ears.  No new concerns    MEDICATIONS  vitamin B complex with vitamin C (STRESS TAB) tablet, Take 1 tablet by mouth  cholecalciferol (D 5000) 125 MCG (5000 UT) CAPS, Take 5,000 Units by mouth (Patient not taking: Reported on 5/30/2023)  LISINOPRIL PO,   mupirocin (BACTROBAN) 2 % external ointment, Use 2 times a day to affected area. (Patient not taking: Reported on 4/14/2023)    No current facility-administered medications on file prior to visit.       PHYSICAL EXAMINATION  VITALS: BP (!) 141/92   Pulse 66   Resp 18   Wt 79.4 kg (175 lb)   GENERAL: Healthy appearing, alert, no acute distress, normal habitus.  CARDIOVASCULAR: Extremities warm and well perfused. Pulses present.   NEUROLOGICAL:  Patient is awake and oriented to self, place and time.  Attention span is normal.  Memory is grossly intact.  Language is fluent and follows commands appropriately.  Appropriate fund of knowledge. Cranial nerves 2-12 are intact. There is no pronator drift.  Motor exam shows 5/5 strength in upper extremities.  Has 4+/5 strength in bilateral hip flexion knee flexion and extension.  Possibly limited due to  pain.  Dorsiflexion plantarflexion are strong.  Tone is symmetric bilaterally in upper and lower extremities.  Reflexes are symmetric and absent in upper extremities and lower extremities.  Ankle jerks are absent.  Sensory exam is grossly intact to light touch, pin prick and vibration with slightly decreased pinprick and vibration in the feet.  Finger to nose and heel to shin is without dysmetria.  Romberg is negative.  Gait is slightly wide-based and the patient is able to do tandem walk and walk on toes and heels with some difficulty.  Exam stable compared to before.    DIAGNOSTICS  MRI L spine 2017  IMPRESSION:   Mild degenerative changes.  Negative for significant disc protrusion spinal   canal or neural foraminal compromise.    XR pelvis -2020  IMPRESSION:   Expected immediate postoperative appearance of right total hip arthroplasty.    RELEVANT LABS  CBC and CMP in 2020 were noncontributory.    OUTSIDE RECORDS  Outside referral notes and chart notes were reviewed and pertinent information has been summarized (in addition to the HPI):-    EMG  CLINICAL INTERPRETATION:  This is an abnormal nerve conduction and EMG study.  The study is suggestive of a sensorimotor polyneuropathy in both legs.  Further clinical correlation is needed.     LABS    Component      Latest Ref Rng 4/14/2023  9:58 AM   ALVARADO interpretation      Negative  Borderline Positive !    ALVARADO pattern 1 Speckled    ALVARADO titer 1 1:80    Performing Laboratory [facility]    Test Name HMG-CoA Reductase Ab, S    Test Code HMGCR    Neutrophil Cytoplasmic Antibody      <1:10  <1:10    Neutrophil Cytoplasmic Antibody Pattern The ANCA IFA is <1:10. No further testing will be performed.    GM1 Antibody IgG      0 - 50 IV 8    GM1 Antibody IgM      0 - 50 IV 6    Vitamin B12      232 - 1,245 pg/mL 407    Vitamin B1 Whole Blood Level      70 - 180 nmol/L 125    TSH      0.30 - 4.20 uIU/mL 1.33    Ammonia      16 - 60 umol/L 16    Angiotensin  Converting Enzyme      16 - 85 U/L 41    CK Total      39 - 308 U/L 80    Ceruloplasmin      20 - 60 mg/dL 21    Copper      70.0 - 140.0 ug/dL 99.0    Lyme Disease Antibodies Serum      <0.90  0.04    Magnesium      1.7 - 2.3 mg/dL 2.0    AcetChol Binding [place]      0.0 - 0.4 nmol/L 0.0    Striated Muscle Antibody IgG      <1:40  <1:40    AcetChol Modul [place]      <=45 % 6    AcetChol Block [place]      0 - 26 % 16    Hemoglobin A1C      <5.7 % 5.7 (H)    [place] Result SEE NOTE       Legend:  ! Abnormal  (H) High    Component      Latest Ref Rn 4/14/2023  9:58 AM   WBC      4.0 - 11.0 10e3/uL 4.1    RBC Count      4.40 - 5.90 10e6/uL 4.49    Hemoglobin      13.3 - 17.7 g/dL 14.1    Hematocrit      40.0 - 53.0 % 41.9    MCV      78 - 100 fL 93    MCH      26.5 - 33.0 pg 31.4    MCHC      31.5 - 36.5 g/dL 33.7    RDW      10.0 - 15.0 % 13.1    Platelet Count      150 - 450 10e3/uL 247    % Neutrophils      % 53    % Lymphocytes      % 32    % Monocytes      % 10    % Eosinophils      % 4    % Basophils      % 1    % Immature Granulocytes      % 0    NRBCs per 100 WBC      <1 /100 0    Absolute Neutrophils      1.6 - 8.3 10e3/uL 2.2    Absolute Lymphocytes      0.8 - 5.3 10e3/uL 1.3    Absolute Monocytes      0.0 - 1.3 10e3/uL 0.4    Absolute Eosinophils      0.0 - 0.7 10e3/uL 0.2    Absolute Basophils      0.0 - 0.2 10e3/uL 0.0    Absolute Immature Granulocytes      <=0.4 10e3/uL 0.0    Absolute NRBCs      10e3/uL 0.0    Sodium      136 - 145 mmol/L 139    Potassium      3.4 - 5.3 mmol/L 4.5    Chloride      98 - 107 mmol/L 105    Carbon Dioxide (CO2)      22 - 29 mmol/L 25    Anion Gap      7 - 15 mmol/L 9    Urea Nitrogen      8.0 - 23.0 mg/dL 16.1    Creatinine      0.67 - 1.17 mg/dL 1.01    Calcium      8.8 - 10.2 mg/dL 8.6 (L)    Glucose      70 - 99 mg/dL 104 (H)    Alkaline Phosphatase      40 - 129 U/L 58    AST      10 - 50 U/L 27    ALT      10 - 50 U/L 41    Protein Total      6.4 - 8.3 g/dL 6.7    Albumin      3.5  - 5.2 g/dL 4.2    Bilirubin Total      <=1.2 mg/dL 0.5    GFR Estimate      >60 mL/min/1.73m2 84    Albumin Fraction      3.7 - 5.1 g/dL 4.3    Alpha 1 Fraction      0.2 - 0.4 g/dL 0.3    Alpha 2 Fraction      0.5 - 0.9 g/dL 0.6    Beta Fraction      0.6 - 1.0 g/dL 0.7    Gamma Fraction      0.7 - 1.6 g/dL 0.8    Monoclonal Peak      <=0.0 g/dL 0.0    ELP Interpretation: Essentially normal electrophoretic pattern. No obvious monoclonal proteins seen. Pathologic significance requires clinical correlation. MIKE Bennett M.D., Ph.D., Pathologist.    Immunofixation ELP No monoclonal protein seen on immunofixation. Pathologic significance requires clinical correlation. MIKE Bennett M.D., Ph.D., Pathologist    Total Protein Serum for ELP      6.4 - 8.3 g/dL 6.6       Legend:  (L) Low  (H) High    IMPRESSION/REPORT/PLAN  History of statin use  Rule out myopathy  Neuropathy  Neuropathic pain  Weakness of both lower extremities  Prediabetes  Positive ALVARADO-suspect false positive    This is a 62 year old male with bilateral leg weakness and generalized pain after use of statins.  Exam today shows absent generalized reflexes with bilateral leg weakness and decreased pinprick and vibratory sense in the feet.  Overall exam is suggestive of neuropathy though there could be superimposed myopathy as well.  Previous testing per patient report has been negative though I do not have a copy of the results.  MRI of his neuraxis has been negative per reports.    Repeat EMG did confirm neuropathy that does not show severe neuropathy.  Blood work has been negative for anti-HMG Co[employer] a reductase inhibitor antibody.  Blood work was positive for prediabetes which could be the cause of his neuropathy.  ALVARADO was positive which I suspect is a false positive as he does not acknowledge any lupus symptoms.  Myasthenia gravis panel was also negative.    The neuropathy on EMG does not appear severe enough to explain his muscle weakness.  Other testing  that could be considered would be a lumbar puncture, muscle biopsy.  With the CK being normal it is less likely for the muscle biopsy did not show a neurological condition.  With the EMG only showing the mild neuropathy is less likely for the lumbar puncture showed neurological disease.  In discussion with patient it was decided to hold off on these for right now though could be considered if there is progression of symptoms/lack of improvement of symptoms.    Could consider repeat imaging of his neural axis since its been 7 years since he was imaged.  Discussed prognosis of unexplained symptoms/undiagnosed symptoms.    Discussed prognosis of neuropathy.  Recommend exercise and healthy lifestyle.    Return back on as-needed basis.    - [facility]    Return if symptoms worsen or fail to improve, for In-Clinic Visit (must).    Over 40 minutes were spent coordinating the care for the patient on the day of the encounter.  This includes previsit, during visit and post visit activities as documented above.  Counseling patient.  Discussion of various other testing options.  Discussion of test results.  (Activities include but not inclusive of reviewing chart, reviewing outside records, reviewing labs and imaging study results as well as the images, patient visit time including getting history and exam,  use if applicable, review of test results with the patient and coming up with a plan in a shared model, counseling patient and family, education and answering patient questions, EMR , EMR diagnosis entry and problem list management, medication reconciliation and prescription management if applicable, paperwork if applicable, printing documents and documentation of the visit activities.)      Сергей Godoy MD  Neurologist  [facility] Neurology [facility]  Tel:- [phone]    This note was dictated using voice recognition software.  Any grammatical or context  distortions are unintentional and inherent to the software.

## 2023-05-30 NOTE — NURSING NOTE
Chief Complaint   Patient presents with     Follow Up     Wants more information on Spinal tap prior to completing     In[employer] MA,CMA,10:04 AM

## 2023-05-30 NOTE — LETTER
5/30/2023         RE: Sander Chavira  [address]  [place] [address]        Dear Colleague,    Thank you for referring your patient, Sander Chavira, to the [facility] [place]. Please see a copy of my visit note below.    NEUROLOGY OUTPATIENT PROGRESS NOTE   May 30, 2023     CHIEF COMPLAINT/REASON FOR VISIT/REASON FOR CONSULT  Patient presents with:  Follow Up: Wants more information on Spinal tap prior to completing     REASON FOR CONSULTATION- Leg weakness/pain    REFERRAL SOURCE  Dr. Srikanth Brown  CC Dr. Srikanth Brown    HISTORY OF PRESENT ILLNESS  Sander Chavira is a 62 year old male seen today for evaluation of leg weakness/pain.  In 2015 he was put on simvastatin for hyperlipidemia.  He is not sure what dose he was put on.  He slowly started having more and more symptoms.  His symptoms consist of generalized weakness and generalized pain.  He realized that this was secondary to the medication and then stopped the medication.  Symptoms did slowly improve but did not completely resolve.  Over the last few years he continues to have weakness in both legs along with a lot of pain.  Does have some numbness in his feet.  He is unable to do a lot of exercises as that makes the pain worse.  He is not currently employed.  When he tries to walk he feels like he is walking on hot coal.  Does report some minor balance issues.  Feels like he is tripping on rugs/carpets.  No major neck pain or back pain.    [facility]  Extensive test including MRI of the brain/cervical spine/T-spine/L-spine were done and these were noncontributory per the patient.  He also had a EMG which was negative.  Did have extensive blood testing that was negative as well.  Has not noticed any new symptoms in the last year though there has been progression of his symptoms.  No family history of neurological problems.    5/30/23  Patient returns today.  His symptoms are about the  same.  Lumbar puncture was ordered which she has not been able to complete.  Reviewed his blood work and he tested positive for prediabetes.  Feels that it is always been positive long-term.  His ALVARADO was also positive the denies any lupus symptoms.  He thinks that his symptoms might be more related to use of statin even though testing did come back negative.    Previous history is reviewed and this is unchanged.    PAST MEDICAL/SURGICAL HISTORY  History reviewed. No pertinent past medical history.  Patient Active Problem List   Diagnosis     Osteoarthritis of left hip   Significant for high cholesterol, high blood pressure    FAMILY HISTORY  History reviewed. No pertinent family history.   High cholesterol high blood pressure migraine headaches arthritis cancer/leukemia.  Skin cancer.    SOCIAL HISTORY  Social History     Tobacco Use     Smoking status: Never     Smokeless tobacco: Current       SYSTEMS REVIEW  Twelve-system ROS was done and other than the HPI this was negative except for arm and leg pain, numbness/tingling, weakness (, difficulty walking, ringing in the ears.  No new concerns    MEDICATIONS  vitamin B complex with vitamin C (STRESS TAB) tablet, Take 1 tablet by mouth  cholecalciferol (D 5000) 125 MCG (5000 UT) CAPS, Take 5,000 Units by mouth (Patient not taking: Reported on 5/30/2023)  LISINOPRIL PO,   mupirocin (BACTROBAN) 2 % external ointment, Use 2 times a day to affected area. (Patient not taking: Reported on 4/14/2023)    No current facility-administered medications on file prior to visit.       PHYSICAL EXAMINATION  VITALS: BP (!) 141/92   Pulse 66   Resp 18   Wt 79.4 kg (175 lb)   GENERAL: Healthy appearing, alert, no acute distress, normal habitus.  CARDIOVASCULAR: Extremities warm and well perfused. Pulses present.   NEUROLOGICAL:  Patient is awake and oriented to self, place and time.  Attention span is normal.  Memory is grossly intact.  Language is fluent and follows commands  appropriately.  Appropriate fund of knowledge. Cranial nerves 2-12 are intact. There is no pronator drift.  Motor exam shows 5/5 strength in upper extremities.  Has 4+/5 strength in bilateral hip flexion knee flexion and extension.  Possibly limited due to pain.  Dorsiflexion plantarflexion are strong.  Tone is symmetric bilaterally in upper and lower extremities.  Reflexes are symmetric and absent in upper extremities and lower extremities.  Ankle jerks are absent.  Sensory exam is grossly intact to light touch, pin prick and vibration with slightly decreased pinprick and vibration in the feet.  Finger to nose and heel to shin is without dysmetria.  Romberg is negative.  Gait is slightly wide-based and the patient is able to do tandem walk and walk on toes and heels with some difficulty.  Exam stable compared to before.    DIAGNOSTICS  MRI L spine 2017  IMPRESSION:   Mild degenerative changes.  Negative for significant disc protrusion spinal   canal or neural foraminal compromise.    XR pelvis -2020  IMPRESSION:   Expected immediate postoperative appearance of right total hip arthroplasty.    RELEVANT LABS  CBC and CMP in 2020 were noncontributory.    OUTSIDE RECORDS  Outside referral notes and chart notes were reviewed and pertinent information has been summarized (in addition to the HPI):-    EMG  CLINICAL INTERPRETATION:  This is an abnormal nerve conduction and EMG study.  The study is suggestive of a sensorimotor polyneuropathy in both legs.  Further clinical correlation is needed.     LABS    Component      Latest Ref Rng 4/14/2023  9:58 AM   ALVARADO interpretation      Negative  Borderline Positive !    ALVARADO pattern 1 Speckled    ALVARADO titer 1 1:80    Performing Laboratory [facility]    Test Name HMG-CoA Reductase Ab, S    Test Code HMGCR    Neutrophil Cytoplasmic Antibody      <1:10  <1:10    Neutrophil Cytoplasmic Antibody Pattern The ANCA IFA is <1:10. No further testing will be performed.    GM1  Antibody IgG      0 - 50 IV 8    GM1 Antibody IgM      0 - 50 IV 6    Vitamin B12      232 - 1,245 pg/mL 407    Vitamin B1 Whole Blood Level      70 - 180 nmol/L 125    TSH      0.30 - 4.20 uIU/mL 1.33    Ammonia      16 - 60 umol/L 16    Angiotensin Converting Enzyme      16 - 85 U/L 41    CK Total      39 - 308 U/L 80    Ceruloplasmin      20 - 60 mg/dL 21    Copper      70.0 - 140.0 ug/dL 99.0    Lyme Disease Antibodies Serum      <0.90  0.04    Magnesium      1.7 - 2.3 mg/dL 2.0    AcetChol Binding [place]      0.0 - 0.4 nmol/L 0.0    Striated Muscle Antibody IgG      <1:40  <1:40    AcetChol Modul [place]      <=45 % 6    AcetChol Block [place]      0 - 26 % 16    Hemoglobin A1C      <5.7 % 5.7 (H)    [place] Result SEE NOTE       Legend:  ! Abnormal  (H) High    Component      Latest Ref [facility] 4/14/2023  9:58 AM   WBC      4.0 - 11.0 10e3/uL 4.1    RBC Count      4.40 - 5.90 10e6/uL 4.49    Hemoglobin      13.3 - 17.7 g/dL 14.1    Hematocrit      40.0 - 53.0 % 41.9    MCV      78 - 100 fL 93    MCH      26.5 - 33.0 pg 31.4    MCHC      31.5 - 36.5 g/dL 33.7    RDW      10.0 - 15.0 % 13.1    Platelet Count      150 - 450 10e3/uL 247    % Neutrophils      % 53    % Lymphocytes      % 32    % Monocytes      % 10    % Eosinophils      % 4    % Basophils      % 1    % Immature Granulocytes      % 0    NRBCs per 100 WBC      <1 /100 0    Absolute Neutrophils      1.6 - 8.3 10e3/uL 2.2    Absolute Lymphocytes      0.8 - 5.3 10e3/uL 1.3    Absolute Monocytes      0.0 - 1.3 10e3/uL 0.4    Absolute Eosinophils      0.0 - 0.7 10e3/uL 0.2    Absolute Basophils      0.0 - 0.2 10e3/uL 0.0    Absolute Immature Granulocytes      <=0.4 10e3/uL 0.0    Absolute NRBCs      10e3/uL 0.0    Sodium      136 - 145 mmol/L 139    Potassium      3.4 - 5.3 mmol/L 4.5    Chloride      98 - 107 mmol/L 105    Carbon Dioxide (CO2)      22 - 29 mmol/L 25    Anion Gap      7 - 15 mmol/L 9    Urea Nitrogen      8.0 - 23.0 mg/dL 16.1    Creatinine      0.67 -  1.17 mg/dL 1.01    Calcium      8.8 - 10.2 mg/dL 8.6 (L)    Glucose      70 - 99 mg/dL 104 (H)    Alkaline Phosphatase      40 - 129 U/L 58    AST      10 - 50 U/L 27    ALT      10 - 50 U/L 41    Protein Total      6.4 - 8.3 g/dL 6.7    Albumin      3.5 - 5.2 g/dL 4.2    Bilirubin Total      <=1.2 mg/dL 0.5    GFR Estimate      >60 mL/min/1.73m2 84    Albumin Fraction      3.7 - 5.1 g/dL 4.3    Alpha 1 Fraction      0.2 - 0.4 g/dL 0.3    Alpha 2 Fraction      0.5 - 0.9 g/dL 0.6    Beta Fraction      0.6 - 1.0 g/dL 0.7    Gamma Fraction      0.7 - 1.6 g/dL 0.8    Monoclonal Peak      <=0.0 g/dL 0.0    ELP Interpretation: Essentially normal electrophoretic pattern. No obvious monoclonal proteins seen. Pathologic significance requires clinical correlation. MIKE Bennett M.D., Ph.D., Pathologist.    Immunofixation ELP No monoclonal protein seen on immunofixation. Pathologic significance requires clinical correlation. MIKE Bennett M.D., Ph.D., Pathologist    Total Protein Serum for ELP      6.4 - 8.3 g/dL 6.6       Legend:  (L) Low  (H) High    IMPRESSION/REPORT/PLAN  History of statin use  Rule out myopathy  Neuropathy  Neuropathic pain  Weakness of both lower extremities  Prediabetes  Positive ALVARADO-suspect false positive    This is a 62 year old male with bilateral leg weakness and generalized pain after use of statins.  Exam today shows absent generalized reflexes with bilateral leg weakness and decreased pinprick and vibratory sense in the feet.  Overall exam is suggestive of neuropathy though there could be superimposed myopathy as well.  Previous testing per patient report has been negative though I do not have a copy of the results.  MRI of his neuraxis has been negative per reports.    Repeat EMG did confirm neuropathy that does not show severe neuropathy.  Blood work has been negative for anti-HMG Co. a reductase inhibitor antibody.  Blood work was positive for prediabetes which could be the cause of his  neuropathy.  ALVARADO was positive which I suspect is a false positive as he does not acknowledge any lupus symptoms.  Myasthenia gravis panel was also negative.    The neuropathy on EMG does not appear severe enough to explain his muscle weakness.  Other testing that could be considered would be a lumbar puncture, muscle biopsy.  With the CK being normal it is less likely for the muscle biopsy did not show a neurological condition.  With the EMG only showing the mild neuropathy is less likely for the lumbar puncture showed neurological disease.  In discussion with patient it was decided to hold off on these for right now though could be considered if there is progression of symptoms/lack of improvement of symptoms.    Could consider repeat imaging of his neural axis since its been 7 years since he was imaged.  Discussed prognosis of unexplained symptoms/undiagnosed symptoms.    Discussed prognosis of neuropathy.  Recommend exercise and healthy lifestyle.    Return back on as-needed basis.    - [facility]    Return if symptoms worsen or fail to improve, for In-Clinic Visit (must).    Over 40 minutes were spent coordinating the care for the patient on the day of the encounter.  This includes previsit, during visit and post visit activities as documented above.  Counseling patient.  Discussion of various other testing options.  Discussion of test results.  (Activities include but not inclusive of reviewing chart, reviewing outside records, reviewing labs and imaging study results as well as the images, patient visit time including getting history and exam,  use if applicable, review of test results with the patient and coming up with a plan in a shared model, counseling patient and family, education and answering patient questions, EMR , EMR diagnosis entry and problem list management, medication reconciliation and prescription management if applicable, paperwork if applicable,  printing documents and documentation of the visit activities.)      Сергей Godoy MD  Neurologist  [facility]  Tel:- [phone]    This note was dictated using voice recognition software.  Any grammatical or context distortions are unintentional and inherent to the software.        Again, thank you for allowing me to participate in the care of your patient.        Sincerely,        Сергей Godoy MD

## 2023-10-04 ENCOUNTER — TELEPHONE (OUTPATIENT)
Dept: INTERVENTIONAL RADIOLOGY/VASCULAR | Facility: HOSPITAL | Age: 63
End: 2023-10-04

## 2023-10-10 ENCOUNTER — HOSPITAL ENCOUNTER (OUTPATIENT)
Dept: RADIOLOGY | Facility: HOSPITAL | Age: 63
Discharge: HOME | End: 2023-10-10
Attending: PSYCHIATRY & NEUROLOGY | Admitting: PSYCHIATRY & NEUROLOGY
Payer: COMMERCIAL

## 2023-10-10 VITALS
DIASTOLIC BLOOD PRESSURE: 78 MMHG | SYSTOLIC BLOOD PRESSURE: 128 MMHG | HEART RATE: 59 BPM | RESPIRATION RATE: 16 BRPM | TEMPERATURE: 98.2 F | OXYGEN SATURATION: 99 %

## 2023-10-10 DIAGNOSIS — G62.9 NEUROPATHY: ICD-10-CM

## 2023-10-10 LAB
APPEARANCE CSF: CLEAR
C GATTII+NEOFOR DNA CSF QL NAA+NON-PROBE: NEGATIVE
CMV DNA CSF QL NAA+NON-PROBE: NEGATIVE
COLOR CSF: COLORLESS
E COLI K1 AG CSF QL: NEGATIVE
EV RNA SPEC QL NAA+PROBE: NEGATIVE
GLUCOSE CSF-MCNC: 62 MG/DL (ref 40–70)
GP B STREP DNA CSF QL NAA+NON-PROBE: NEGATIVE
HAEM INFLU DNA CSF QL NAA+NON-PROBE: NEGATIVE
HHV6 DNA CSF QL NAA+NON-PROBE: NEGATIVE
HSV1 DNA CSF QL NAA+NON-PROBE: NEGATIVE
HSV2 DNA CSF QL NAA+NON-PROBE: NEGATIVE
L MONOCYTOG DNA CSF QL NAA+NON-PROBE: NEGATIVE
N MEN DNA CSF QL NAA+NON-PROBE: NEGATIVE
PARECHOVIRUS A RNA CSF QL NAA+NON-PROBE: NEGATIVE
PROT CSF-MCNC: 38 MG/DL (ref 15–45)
RBC # CSF MANUAL: 8 /UL (ref 0–2)
S PNEUM DNA CSF QL NAA+NON-PROBE: NEGATIVE
TUBE # CSF: 4
VZV DNA CSF QL NAA+NON-PROBE: NEGATIVE
WBC # CSF MANUAL: 0 /UL (ref 0–5)

## 2023-10-10 PROCEDURE — 86617 LYME DISEASE ANTIBODY: CPT

## 2023-10-10 PROCEDURE — 36415 COLL VENOUS BLD VENIPUNCTURE: CPT

## 2023-10-10 PROCEDURE — 82784 ASSAY IGA/IGD/IGG/IGM EACH: CPT

## 2023-10-10 PROCEDURE — 87483 CNS DNA AMP PROBE TYPE 12-25: CPT

## 2023-10-10 PROCEDURE — 88108 CYTOPATH CONCENTRATE TECH: CPT | Mod: 26 | Performed by: PATHOLOGY

## 2023-10-10 PROCEDURE — 82945 GLUCOSE OTHER FLUID: CPT

## 2023-10-10 PROCEDURE — 88108 CYTOPATH CONCENTRATE TECH: CPT | Mod: TC

## 2023-10-10 PROCEDURE — 62328 DX LMBR SPI PNXR W/FLUOR/CT: CPT

## 2023-10-10 PROCEDURE — 89050 BODY FLUID CELL COUNT: CPT

## 2023-10-10 PROCEDURE — 84157 ASSAY OF PROTEIN OTHER: CPT

## 2023-10-10 NOTE — LETTER
October 13, 2023      Sander Chavira  [address]   [facility] [address]        Dear ,    We are writing to inform you of your test results.    Lumbar puncture results are noncontributory.    Resulted Orders   Protein total CSF:   Result Value Ref Range    Protein total CSF 38.0 15.0 - 45.0 mg/dL   Glucose CSF:   Result Value Ref Range    Glucose CSF 62 40 - 70 mg/dL    Narrative    CSF glucose concentrations are about 60 percent of normal plasma glucose.   Lyme IgG and IgM CSF Immunoblot:   Result Value Ref Range    Lymes IgG CSF Immunoblot Negative Negative      Comment:      Band(s) present: NONE  (Insufficient number of bands for positive result)  INTERPRETIVE INFORMATION: Borrelia burgdorferi Ab, IgG,                            IB (CSF)    For this assay, a positive result is reported when any 5 or   more of the following 10 bands are present:  18, 23, 28,   30, 39, 41, 45, 58, 66, or 93 kDa.  All other banding   patterns are reported as negative.    The detection of antibodies to Borrelia burgdorferi in   cerebrospinal fluid may indicate central nervous system   infection. However, consideration must be given to possible   contamination by blood or transfer of serum antibodies   across the blood-brain barrier.    This test was developed and its performance characteristics   determined by [facility]. It has not been cleared or   approved by the US Food and Drug Administration. This test   was performed in a CLIA certified laboratory and is   intended for clinical purposes.    Lymes IgM CSF Immunoblot Negative Negative      Comment:      Band(s) present: NONE  (Insufficient number of bands for positive result)  INTERPRETIVE INFORMATION: Borrelia burgdorferi Ab, IgM,                            IB (CSF)    For this assay, a positive result is reported when any 2 or   more of the following bands are present: 23, 39, 41 kDa.    All other banding paterns are reported as negative.    The detection of  antibodies to Borrelia burgdorferi in   cerebrospinal fluid may indicate central nervous system   infection. However, consideration must be given to possible   contamination by blood or transfer of serum antibodies   across the blood-brain barrier.    This test was developed and its performance characteristics   determined by [facility]. It has not been cleared or   approved by the US Food and Drug Administration. This test   was performed in a CLIA certified laboratory and is   intended for clinical purposes.  Performed By: [facility]  [address] [place] [address]  : Torin Ronquillo MD, PhD  CLIA Number: [id number]   Meningitis/Encephalitis Panel Qual PCR CSF:   Result Value Ref Range    Escherichia coli K1 Negative Negative    Haemophilus influenzae Negative Negative    Listeria monocytogenes Negative Negative    Neisseria meningitidis Negative Negative    Streptococcus agalactiae (GBS) Negative Negative    Streptococcus pneumoniae Negative Negative    Cytomegalovirus Negative Negative    Enterovirus Negative Negative    Herpes simplex virus 1 Negative Negative      Comment:      Recommend testing with another molecular method if clinical suspicion for infection is high.    Herpes simplex virus 2 Negative Negative      Comment:      Recommend testing with another molecular method if clinical suspicion for infection is high.    Human Herpes Virus 6 Negative Negative    Human parechovirus Negative Negative    Varicella zoster virus Negative Negative    Cryptococcus neoformans/gattii Negative Negative      Comment:      Recommend testing for Cryptococcal antigen and fungal culture if clinical suspicion for infection is high.    Narrative    Assay performed using the FDA-cleared FilmArray ME Panel from [employer] [facility]    This test has been verified and is performed by the Infectious Diseases Diagnostic Laboratory at [facility]. This laboratory is  certified under the Clinical Laboratory Improvement Amendments of 1988 (CLIA-88) as qualified to perform high complexity clinical laboratory testing.  A negative result does not rule out the presence of PCR inhibitors in the specimen or target nucleic acids are in concentration below the limit of detection of the assay.   A negative result should not rule out central nervous system infection with a high probability for meningitis or encephalitis. The assay does not test for all potential infectious agents.  Results are intended to aid in the diagnosis of illness and are meant to be used in conjunction with other clinical findings.   Cytology, non-gynecologic   Result Value Ref Range    Final Diagnosis       Specimen A     Interpretation:      Negative for malignancy    CEREBROSPINAL FLUID, LUMBAR PUNCTURE:            -  NEGATIVE FOR SIGNIFICANT INFLAMMATORY EXUDATE BY                     VISUAL ESTIMATION (MUST BE CONFIRMED BY CELL COUNT)            -  NEGATIVE FOR ATYPICAL OR MALIGNANT CELLS     Adequacy:     Satisfactory for evaluation          Clinical Information       Evaluate for malignancy      Gross Description       A(A). Lumbar Puncture, CSF:  Received 3 ml of clear, colorless fluid, processed as 1 Pap stained cytospin and 1 Ace stained cytospin.      Microscopic Description       Material examined consists of 1 cytospin preparation stained with Pap stain, and 1 cytospin preparation stained with Ace stain.  These demonstrate small numbers of intact red blood cells, rare lymphocytes, and very rare neutrophils.  By visual estimation, the total number of leukocytes does not appear elevated, but this must be confirmed by an actual cell count.  Other cell types are not identified.      Performing Labs       The technical component of this testing was completed at [facility]     Oligoclonal Banding:   Result Value Ref Range    CSF  Oligoclonal Bands Number 0 0 - 1 Bands    Immunoglobulin Serum IgG 834 768 - 1632 mg/dL      Comment:      REFERENCE INTERVAL: Immunoglobulin G    Access complete set of age- and/or gender-specific   reference intervals for this test in the [facility]   Test Directory ([email or web address]).    Oligoclonal IgG CSF 3.0 0.0 - 6.0 mg/dL    Albumin Serum 4139 3500 - 5200 mg/dL    Oligoclonal Albumin CSF 27 0 - 35 mg/dL    Oligoclonal Albumin Index 6.5 0.0 - 9.0 ratio    Oligoclonal IgG Index 0.55 0.28 - 0.66 ratio    CSF IgG/Albumin Ratio 0.11 0.09 - 0.25 ratio    Oligoclonal Banding Negative Negative    IgG Synthesis Rate <0.0 <=8.0 mg/d    Oligoclonal Interpretation See Note       Comment:      Isoelectric focusing/immunofixation revealed no oligoclonal   bands in either the CSF or the serum. This is considered to   be a negative result for oligoclonal bands. Approximately 5   percent of patients with clinically definitive multiple   sclerosis will have a negative result.  Performed By: [facility]  [address] [place] [id number]  : Torin Ronquillo MD, PhD  CLIA Number: [id number]   Cell Count CSF   Result Value Ref Range    Tube Number 4     Color Colorless Colorless    Clarity Clear Clear    Total Nucleated Cells 0 0 - 5 /uL    RBC Count 8 (H) 0 - 2 /uL    Narrative    No differential performed, <5 WBC.  No abnormal cells seen.       If you have any questions or concerns, please call the clinic at the number listed above.       Sincerely,      Сергей Godoy MD

## 2023-10-10 NOTE — PROCEDURES
Neurointerventional Surgery  Post-procedure note    Patient Name: Sander Chavira  MRN: [id number]  Date of Procedure: October 10, 2023    Procedure: Fluoroscopically guided lumbar puncture  Radiologist: ANGELES ALEGRIA MD    Fluoro Time: 0.5 minutes  Air Kerma: 8.4 mGy    EBL: 0 mL  Complications: None    Patient reevaluated immediately prior to sedation and prior to procedure.    Preliminary Report:   (See dictation for full detail)  Successfully fluoroscopically guided L4-5 lumbar puncture with opening pressures. Opening pressure was measured at 12 cm H2O. A total of 18 mL of clear CSF was collected.     Assess/Plan:  Bed rest with HOB flat x 1 hour then activity ad cailin.   Report to ordering    ANGELES ALEGRIA MD MD  [phone]

## 2023-10-10 NOTE — DISCHARGE INSTRUCTIONS
1. Rest today lying down as much as possible. Avoid strenuous activity for 48 hours.    2. You should follow your normal diet and drink plenty of fluids.    3. Keep procedure site dry and clean.    4. Follow-up with the doctor who ordered this test in 7 days regarding the results of the procedure.    5. Call your doctor with any further questions, concerns, and to resume pre-procedure medications.         ADDITIONAL INSTRUCTIONS    When to call you Doctor:    1. On-going severe headache, nausea, vomiting, or un-usual increase in pain call your doctor or 9-1-1 or go to the emergency room.

## 2023-10-11 ENCOUNTER — NURSE TRIAGE (OUTPATIENT)
Dept: NURSING | Facility: CLINIC | Age: 63
End: 2023-10-11

## 2023-10-11 ENCOUNTER — TELEPHONE (OUTPATIENT)
Dept: NEUROLOGY | Facility: CLINIC | Age: 63
End: 2023-10-11

## 2023-10-11 LAB
PATH REPORT.COMMENTS IMP SPEC: NORMAL
PATH REPORT.FINAL DX SPEC: NORMAL
PATH REPORT.GROSS SPEC: NORMAL
PATH REPORT.MICROSCOPIC SPEC OTHER STN: NORMAL
PATH REPORT.RELEVANT HX SPEC: NORMAL

## 2023-10-11 NOTE — TELEPHONE ENCOUNTER
George Molina is a 48 year old male presenting with L foot/ankle and L knee pain for the past 2-3 days. Using otc Aleve.    Medication verified, no changes  Denies known Latex allergy or symptoms of Latex sensitivity     [facility] Call Center    Phone Message    May a detailed message be left on voicemail: yes     Reason for Call:  Patient called states he just had a lumbar puncture done yesterday and wondering how long before his results are available? Also pt is wondering what  is testing for? Pt asking to speak to care team    Action Taken: Other: [facility] neurolog[facility]  call back at [phone]      Travel Screening: Not Applicable

## 2023-10-11 NOTE — TELEPHONE ENCOUNTER
Pt is phoning stating that he had a Lumbar Puncture yesterday and is now having a headache     Rates headache pain 2/10    No fever     No Vomiting     No Visual changes     Per disposition: See in Office Today  - pt states that he will reach out to provider to ask how long a mild headache can last and also to discuss when pt should reach out. Writer educated on monitoring for fever, severe headache, vomiting or visual changes and to call immediately if any of these occur     Care advice given per protocol and when to call back. Pt verbalized understanding and agrees to plan of care.    Natalia Gan RN  [facility] Nurse Advisor  7:28 AM 10/11/2023        Reason for Disposition   Patient wants to be seen    Additional Information   Negative: Difficult to awaken or acting confused (e.g., disoriented, slurred speech)   Negative: Weakness of the face, arm or leg on one side of the body and new-onset   Negative: Numbness of the face, arm or leg on one side of the body and new-onset   Negative: Loss of speech or garbled speech and new-onset   Negative: Passed out (i.e., fainted, collapsed and was not responding)   Negative: Sounds like a life-threatening emergency to the triager   Negative: Followed a head injury within last 3 days   Negative: Traumatic Brain Injury (TBI) is suspected   Negative: Sinus pain of forehead and yellow or green nasal discharge   Negative: Pregnant   Negative: Unable to walk without falling   Negative: Stiff neck (can't touch chin to chest)   Negative: Possibility of carbon monoxide exposure   Negative: SEVERE headache, states 'worst headache' of life   Negative: SEVERE headache, sudden-onset (i.e., reaching maximum intensity within seconds to 1 hour)   Negative: Severe pain in one eye   Negative: Loss of vision or double vision  (Exception: Same as prior migraines.)   Negative: Patient sounds very sick or weak to the triager   Negative: Fever > 103 F (39.4 C)   Negative: Fever > 100.0 F  (37.8 C) and has diabetes mellitus or a weak immune system (e.g., HIV positive, cancer chemotherapy, organ transplant, splenectomy, chronic steroids)   Negative: SEVERE headache (e.g., excruciating) and has had severe headaches before   Negative: SEVERE headache and not relieved by pain meds   Negative: SEVERE headache and vomiting   Negative: SEVERE headache and fever   Negative: New headache and weak immune system (e.g., HIV positive, cancer chemo, splenectomy, organ transplant, chronic steroids)   Negative: Fever present > 3 days (72 hours)    Protocols used: Headache-A-[place]

## 2023-10-12 LAB
ALB CSF/SERPL: 6.5 RATIO
ALBUMIN CSF-MCNC: 27 MG/DL
ALBUMIN SERPL-MCNC: 4139 MG/DL
B BURGDOR IGG CSF QL IB: NEGATIVE
B BURGDOR IGM CSF QL IB: NEGATIVE
IGG CSF-MCNC: 3 MG/DL
IGG SERPL-MCNC: 834 MG/DL
IGG SYNTH RATE SER+CSF CALC-MRATE: <0 MG/D
IGG/ALB CLEAR SER+CSF-RTO: 0.55 RATIO
IGG/ALB CSF: 0.11 RATIO
OLIGOCLONAL BANDS CSF ELPH-IMP: NEGATIVE
OLIGOCLONAL BANDS CSF ELPH-IMP: NORMAL
OLIGOCLONAL BANDS CSF IEF: 0 BANDS

## 2023-10-12 NOTE — TELEPHONE ENCOUNTER
Called and spoke with aSnder, conveying that it may take a couple days or more to get any results back from the lumbar puncture, and depending on findings from the test, a treatment plan can be better designed based on symptoms and what the diagnostic studies may reveal.    Patient states understanding.    Gonzales Seay RN, BSN  [facility]

## 2023-10-13 ENCOUNTER — NURSE TRIAGE (OUTPATIENT)
Dept: NURSING | Facility: CLINIC | Age: 63
End: 2023-10-13

## 2023-10-13 ENCOUNTER — TELEPHONE (OUTPATIENT)
Dept: NEUROLOGY | Facility: CLINIC | Age: 63
End: 2023-10-13

## 2023-10-13 NOTE — TELEPHONE ENCOUNTER
Nurse Triage SBAR    Is this a 2nd Level Triage? YES, LICENSED PRACTITIONER REVIEW IS REQUIRED    Situation:  Severe migraine headache after lumbar puncture    Background/Assessment:     Pt reporting, since having the lumbar puncture on Tuesday of this last week.    Pt has a severe headache all the time.  Nauseated, dizzy and wanting to know what can be done for the symptoms he is having.       Pt would like some thing done today.    Please call the Pt back @ [phone] for further assistance.    Thank you     Khushbu James RN  Central Triage Red Flags/Med Refills      Protocol Recommended Disposition:   See in Office Today          Reason for Disposition   Patient wants to be seen    Additional Information   Negative: Difficult to awaken or acting confused (e.g., disoriented, slurred speech)   Negative: Weakness of the face, arm or leg on one side of the body and new-onset   Negative: Numbness of the face, arm or leg on one side of the body and new-onset   Negative: Loss of speech or garbled speech and new-onset   Negative: Passed out (i.e., fainted, collapsed and was not responding)   Negative: Sounds like a life-threatening emergency to the triager   Negative: Followed a head injury within last 3 days   Negative: Traumatic Brain Injury (TBI) is suspected   Negative: Sinus pain of forehead and yellow or green nasal discharge   Negative: Pregnant   Negative: Unable to walk without falling   Negative: Stiff neck (can't touch chin to chest)   Negative: Possibility of carbon monoxide exposure   Negative: SEVERE headache, states 'worst headache' of life   Negative: SEVERE headache, sudden-onset (i.e., reaching maximum intensity within seconds to 1 hour)   Negative: Severe pain in one eye   Negative: Loss of vision or double vision  (Exception: Same as prior migraines.)   Negative: Patient sounds very sick or weak to the triager   Negative: Fever > 103 F (39.4 C)   Negative: Fever > 100.0 F (37.8 C) and has diabetes  mellitus or a weak immune system (e.g., HIV positive, cancer chemotherapy, organ transplant, splenectomy, chronic steroids)   Negative: SEVERE headache (e.g., excruciating) and has had severe headaches before   Negative: SEVERE headache and not relieved by pain meds   Negative: SEVERE headache and vomiting   Negative: SEVERE headache and fever   Negative: New headache and weak immune system (e.g., HIV positive, cancer chemo, splenectomy, organ transplant, chronic steroids)   Negative: Fever present > 3 days (72 hours)    Protocols used: Headache-A-[place]

## 2023-10-13 NOTE — TELEPHONE ENCOUNTER
[facility] Call Center    Phone Message    May a detailed message be left on voicemail: yes     Reason for Call: Symptoms or Concerns     If patient has red-flag symptoms, warm transfer to triage line    Current symptom or concern: severe headaches      Symptoms have been present for:  3 day(s)    Has patient previously been seen for this? No          Are there any new or worsening symptoms? Yes: New    Action Taken: Message routed to:  Other: [facility] neurology    Travel Screening: Not Applicable

## 2023-10-13 NOTE — TELEPHONE ENCOUNTER
RN reviewed message below and RN triage notation.     Called pt- he reports headache since Tuesday when LP was done. Has severe headache when upright, relieved when lying down.     RN advised increased fluids, caffeine, and rest- lots  of time lying flat. If headache not improving by tomorrow or Sunday he should present to ER for evaluation and possible blood patch. Pt agreeable to this plan.       Maged Juárez RN, BSN  [facility]